# Patient Record
Sex: FEMALE | Race: WHITE | NOT HISPANIC OR LATINO | Employment: OTHER | URBAN - METROPOLITAN AREA
[De-identification: names, ages, dates, MRNs, and addresses within clinical notes are randomized per-mention and may not be internally consistent; named-entity substitution may affect disease eponyms.]

---

## 2018-08-20 ENCOUNTER — OFFICE VISIT (OUTPATIENT)
Dept: ORTHOPEDIC SURGERY | Facility: CLINIC | Age: 73
End: 2018-08-20

## 2018-08-20 VITALS — WEIGHT: 226.19 LBS | BODY MASS INDEX: 37.69 KG/M2 | OXYGEN SATURATION: 96 % | HEART RATE: 90 BPM | HEIGHT: 65 IN

## 2018-08-20 DIAGNOSIS — S97.82XA CRUSHING INJURY OF LEFT FOOT, INITIAL ENCOUNTER: Primary | ICD-10-CM

## 2018-08-20 DIAGNOSIS — I87.8 VENOUS STASIS: ICD-10-CM

## 2018-08-20 PROCEDURE — 99203 OFFICE O/P NEW LOW 30 MIN: CPT | Performed by: ORTHOPAEDIC SURGERY

## 2018-08-20 RX ORDER — AMLODIPINE BESYLATE 10 MG/1
10 TABLET ORAL DAILY
Refills: 5 | COMMUNITY
Start: 2018-07-19

## 2018-08-20 RX ORDER — ASPIRIN 81 MG/1
81 TABLET ORAL DAILY
COMMUNITY

## 2018-08-20 RX ORDER — HYDROCHLOROTHIAZIDE 25 MG/1
25 TABLET ORAL DAILY
Refills: 5 | COMMUNITY
Start: 2018-07-19 | End: 2020-01-21

## 2018-08-20 RX ORDER — FUROSEMIDE 40 MG/1
40 TABLET ORAL DAILY
Refills: 3 | COMMUNITY
Start: 2018-05-29

## 2018-08-20 RX ORDER — TIZANIDINE HYDROCHLORIDE 4 MG/1
4 CAPSULE ORAL 3 TIMES DAILY
Refills: 3 | COMMUNITY
Start: 2018-07-06

## 2018-08-20 RX ORDER — POTASSIUM CHLORIDE 750 MG/1
CAPSULE, EXTENDED RELEASE ORAL
Refills: 5 | COMMUNITY
Start: 2018-07-25

## 2018-08-20 RX ORDER — LEVOTHYROXINE SODIUM 112 MCG
112 TABLET ORAL DAILY
Refills: 2 | COMMUNITY
Start: 2018-06-04

## 2018-08-20 RX ORDER — OLMESARTAN MEDOXOMIL 40 MG/1
40 TABLET ORAL DAILY
Refills: 5 | COMMUNITY
Start: 2018-07-19

## 2018-08-20 RX ORDER — CELECOXIB 200 MG/1
200 CAPSULE ORAL DAILY
Refills: 6 | COMMUNITY
Start: 2018-07-05

## 2018-08-20 RX ORDER — MECLIZINE HYDROCHLORIDE 25 MG/1
25 TABLET ORAL DAILY PRN
Refills: 1 | COMMUNITY
Start: 2018-07-05

## 2018-08-20 NOTE — PROGRESS NOTES
"NEW PATIENT    Patient: Inga Manzo  : 1945    Primary Care Provider: Tim Anaya PA    Requesting Provider: As above    Pain and Edema of the Left Foot      History    Chief Complaint: Left foot injury    History of Present Illness: This is a 73-year-old woman here today with her .  She looks much younger than stated age.  She reports that on July 3, she \"banged up\" her left foot on a plastic stepstool.  She had medial and lateral ecchymosis.  She had a lot of soreness.  She had an x-ray done at that time, and she brought a disc.  Unfortunately the x-ray on the disc is dated 2017.  She reports she never had an x-ray before that day last month, but the x-ray has a different date.  Therefore we will need to repeat it.  She reports that the pain improved, but she still has a level 1-2 out of 10 soreness over medial bunion and lateral cuboid.  She has a history of right insertional Achilles tendinitis that she treats with the boot.  She does have swelling in her legs she reports that she wears support stockings \"sometimes\" she has quite severe edema and venous stasis changes and I recommend she wear the stockings daily.  We discussed her to obtain them and what type.    No current outpatient prescriptions on file prior to visit.     No current facility-administered medications on file prior to visit.       No Known Allergies   Past Medical History:   Diagnosis Date   • Hypertension      Past Surgical History:   Procedure Laterality Date   • CATARACT EXTRACTION, BILATERAL     • CHOLECYSTECTOMY     • HYSTERECTOMY     • OOPHORECTOMY       Family History   Problem Relation Age of Onset   • Heart disease Mother    • Arthritis Mother    • Hypertension Mother    • Diabetes Mother    • Cancer Father    • Stroke Father       Social History     Social History   • Marital status:      Spouse name: N/A   • Number of children: N/A   • Years of education: N/A     Occupational History   • Not " "on file.     Social History Main Topics   • Smoking status: Never Smoker   • Smokeless tobacco: Never Used   • Alcohol use No   • Drug use: No   • Sexual activity: Defer     Other Topics Concern   • Not on file     Social History Narrative   • No narrative on file        Review of Systems   Constitutional: Negative.    Eyes: Positive for itching.   Respiratory: Negative.    Cardiovascular: Positive for leg swelling.   Gastrointestinal: Negative.    Endocrine: Negative.    Genitourinary: Negative.    Musculoskeletal: Positive for arthralgias, joint swelling and neck pain.   Skin: Negative.    Allergic/Immunologic: Negative.    Neurological: Positive for dizziness and headaches.   Hematological: Negative.    Psychiatric/Behavioral: Negative.        The following portions of the patient's history were reviewed and updated as appropriate: allergies, current medications, past family history, past medical history, past social history, past surgical history and problem list.    Physical Exam:   Pulse 90   Ht 165.1 cm (65\")   Wt 103 kg (226 lb 3.1 oz)   SpO2 96%   BMI 37.64 kg/m²   GENERAL: Body habitus: obese    Lower extremity edema: Left: 3+ pitting; Right: 3+ pitting    Varicose veins:  Left: venous stasis pigment; Right: venous stasis pigment    Gait: normal     Mental Status:  awake and alert; oriented to person, place, and time    Voice:  clear  SKIN:  venous stasis pigment    Hair Growth:  Right:diminished; Left:  diminished  NAILS: Toenails: normal  HEENT: Head: Normocephalic, atraumatic,  without obvious abnormality.  eye: normal external eye, no icterus  ears: normal external ears  nose: normal external nose  pharynx: dental hygiene adequate  PULM:  Repiratory effort normal  CV:  Dorsalis Pedis:  Right: 2+; Left:2+    Posterior Tibial: Right:2+; Left:2+    Capillary Refill:  Brisk  MSK:  Hand:right handed and moderate arthritis      Tibia:  Right:  tender over subcutaneous border; Left:  tender over " subcutaneous border      Ankle:  Right: non tender, ROM  normal and symmetric and motor function  normal; Left:  non tender, ROM  normal and symmetric and motor function  normal      Foot:  Right:  non tender, ROM  normal and symmetric and motor function  normal; Left:  No tenderness in the left foot, no tenderness over the bunion, no tenderness over the cuboid, normal range of motion, normal alignment except for the bunion      NEURO: Heel Walking:  Right:  normal; Left:  normal    Toe Walking:  Right:  normal; Left:  normal     [place]-Elie 5.07 monofilament test: normal    Lower extremity sensation: intact     Reflexes:  Biceps:  Right:  2+; Left:  2+           Quads:  Right:  2+; Left:  2+           Ankle:  Right:  1+; Left:  1+      Calf Atrophy:none    Motor Function: all 5/5         Medical Decision Making    Data Review:   ordered and reviewed x-rays today, reviewed radiology images and reviewed outside records I reviewed her outside medical records as well as the report of an x-ray done on July 5, 2018, which was no fracture.  I also reviewed the x-ray on a disc that was dated August 7, 2017.  No fractures on that one.    Assessment and Plan/ Diagnosis/Treatment options:   1. Crushing injury of left foot, initial encounter  I do not see any fractures.  Her symptoms have improved.  I recommend she work on some strengthening exercises on her own.  I would recommend good comfortable shoes.  I will be happy to see her any time.      2. Venous stasis  She has significant venous stasis, I think it's really important she wear support stockings daily.  We discussed where to obtain them and what type

## 2019-08-21 RX ORDER — SODIUM, POTASSIUM,MAG SULFATES 17.5-3.13G
2 SOLUTION, RECONSTITUTED, ORAL ORAL TAKE AS DIRECTED
Qty: 354 ML | Refills: 0 | Status: SHIPPED | OUTPATIENT
Start: 2019-08-21 | End: 2020-01-21

## 2019-08-29 ENCOUNTER — LAB REQUISITION (OUTPATIENT)
Dept: LAB | Facility: HOSPITAL | Age: 74
End: 2019-08-29

## 2019-08-29 ENCOUNTER — OUTSIDE FACILITY SERVICE (OUTPATIENT)
Dept: GASTROENTEROLOGY | Facility: CLINIC | Age: 74
End: 2019-08-29

## 2019-08-29 DIAGNOSIS — R19.7 DIARRHEA: ICD-10-CM

## 2019-08-29 DIAGNOSIS — Z86.010 HISTORY OF COLONIC POLYPS: ICD-10-CM

## 2019-08-29 PROCEDURE — 45380 COLONOSCOPY AND BIOPSY: CPT | Performed by: INTERNAL MEDICINE

## 2019-08-29 PROCEDURE — 88305 TISSUE EXAM BY PATHOLOGIST: CPT | Performed by: INTERNAL MEDICINE

## 2019-08-30 LAB
CYTO UR: NORMAL
LAB AP CASE REPORT: NORMAL
LAB AP CLINICAL INFORMATION: NORMAL
PATH REPORT.FINAL DX SPEC: NORMAL
PATH REPORT.GROSS SPEC: NORMAL

## 2020-01-21 ENCOUNTER — OFFICE VISIT (OUTPATIENT)
Dept: GASTROENTEROLOGY | Facility: CLINIC | Age: 75
End: 2020-01-21

## 2020-01-21 VITALS
WEIGHT: 223 LBS | HEART RATE: 71 BPM | BODY MASS INDEX: 37.15 KG/M2 | SYSTOLIC BLOOD PRESSURE: 142 MMHG | DIASTOLIC BLOOD PRESSURE: 62 MMHG | HEIGHT: 65 IN

## 2020-01-21 DIAGNOSIS — K58.0 IRRITABLE BOWEL SYNDROME WITH DIARRHEA: Primary | ICD-10-CM

## 2020-01-21 PROCEDURE — 99212 OFFICE O/P EST SF 10 MIN: CPT | Performed by: NURSE PRACTITIONER

## 2020-01-21 RX ORDER — POTASSIUM CHLORIDE 20 MEQ/1
1 TABLET, EXTENDED RELEASE ORAL DAILY
COMMUNITY
Start: 2020-01-16

## 2020-01-21 RX ORDER — SPIRONOLACTONE 50 MG/1
1 TABLET, FILM COATED ORAL DAILY
COMMUNITY
Start: 2019-10-30

## 2020-01-21 RX ORDER — COLESEVELAM 180 1/1
1250 TABLET ORAL
Qty: 540 TABLET | Refills: 3 | Status: SHIPPED | OUTPATIENT
Start: 2020-01-21

## 2020-01-21 RX ORDER — IBUPROFEN 600 MG/1
1 TABLET ORAL AS NEEDED
COMMUNITY
Start: 2019-10-30

## 2020-01-21 RX ORDER — MONTELUKAST SODIUM 10 MG/1
1 TABLET ORAL DAILY
COMMUNITY
Start: 2020-01-16

## 2020-01-21 RX ORDER — COLESEVELAM 180 1/1
1 TABLET ORAL DAILY
COMMUNITY
Start: 2020-01-14 | End: 2020-01-21 | Stop reason: SDUPTHER

## 2020-01-21 NOTE — PROGRESS NOTES
GASTROENTEROLOGY OFFICE NOTE  Inga Manzo  [id number]  1945    CARE TEAM  Patient Care Team:  Tim Anaya PA as PCP - General (Physician Assistant)    Referring Provider: Tim Anaya PA    Chief Complaint   Patient presents with   • Diarrhea        HISTORY OF PRESENT ILLNESS:  The patient is a 74-year-old female who is seen today for diarrhea.  Last colonoscopy in August 2019 was normal.  She had complaints of postprandial diarrhea at that time which was well controlled taking WelChol 625 mg 2 with meals.  She continues to do well on this regimen. At times she does have some episodes of diarrhea she feels it is likely related to diet.    PAST MEDICAL HISTORY  Past Medical History:   Diagnosis Date   • Hypertension         PAST SURGICAL HISTORY  Past Surgical History:   Procedure Laterality Date   • CATARACT EXTRACTION, BILATERAL     • CHOLECYSTECTOMY     • HYSTERECTOMY     • OOPHORECTOMY          MEDICATIONS:    Current Outpatient Medications:   •  amLODIPine (NORVASC) 10 MG tablet, Take 10 mg by mouth Daily., Disp: , Rfl: 5  •  aspirin 81 MG EC tablet, Take 81 mg by mouth Daily., Disp: , Rfl:   •  celecoxib (CeleBREX) 200 MG capsule, Take 200 mg by mouth Daily., Disp: , Rfl: 6  •  Cholecalciferol (VITAMIN D PO), Take  by mouth Daily., Disp: , Rfl:   •  CINNAMON PO, Take  by mouth Daily., Disp: , Rfl:   •  colesevelam (WELCHOL) 625 MG tablet, Take 2 tablets by mouth 3 (Three) Times a Day With Meals., Disp: 540 tablet, Rfl: 3  •  Cyanocobalamin (VITAMIN B-12 ER PO), Take  by mouth Daily., Disp: , Rfl:   •  ibuprofen (ADVIL,MOTRIN) 600 MG tablet, Take 1 tablet by mouth As Needed., Disp: , Rfl:   •  LASIX 40 MG tablet, Take 40 mg by mouth Daily., Disp: , Rfl: 3  •  meclizine (ANTIVERT) 25 MG tablet, Take 25 mg by mouth Daily As Needed., Disp: , Rfl: 1  •  montelukast (SINGULAIR) 10 MG tablet, Take 1 tablet by mouth Daily., Disp: , Rfl:   •  Multiple Vitamin (MULTI-VITAMIN DAILY PO), Take  by  "mouth Daily., Disp: , Rfl:   •  olmesartan (BENICAR) 40 MG tablet, Take 40 mg by mouth Daily., Disp: , Rfl: 5  •  Omega-3 Fatty Acids (FISH OIL PO), Take  by mouth Daily., Disp: , Rfl:   •  potassium chloride (K-DUR,KLOR-CON) 20 MEQ CR tablet, Take 1 tablet by mouth Daily., Disp: , Rfl:   •  potassium chloride (MICRO-K) 10 MEQ CR capsule, TAKE  1  CAPSULE BY MOUTH TWICE DAILY WITH FOOD, Disp: , Rfl: 5  •  spironolactone (ALDACTONE) 50 MG tablet, Take 1 tablet by mouth Daily., Disp: , Rfl:   •  SYNTHROID 112 MCG tablet, Take 112 mcg by mouth Daily., Disp: , Rfl: 2  •  ZANAFLEX 4 MG capsule, Take 4 mg by mouth 3 (Three) Times a Day., Disp: , Rfl: 3    ALLERGIES  No Known Allergies    FAMILY HISTORY:  Family History   Problem Relation Age of Onset   • Heart disease Mother    • Arthritis Mother    • Hypertension Mother    • Diabetes Mother    • Cancer Father    • Stroke Father        SOCIAL HISTORY  Social History     Socioeconomic History   • Marital status:      Spouse name: Not on file   • Number of children: Not on file   • Years of education: Not on file   • Highest education level: Not on file   Tobacco Use   • Smoking status: Never Smoker   • Smokeless tobacco: Never Used   Substance and Sexual Activity   • Alcohol use: No   • Drug use: No   • Sexual activity: Defer       REVIEW OF SYSTEMS  Review of Systems   Constitutional: Negative for activity change, appetite change, chills, diaphoresis, fatigue, fever, unexpected weight gain and unexpected weight loss.   HENT: Negative for trouble swallowing and voice change.    Gastrointestinal: Negative for abdominal distention, abdominal pain, anal bleeding, blood in stool, constipation, diarrhea, nausea, rectal pain, vomiting, GERD and indigestion.     PHYSICAL EXAM   /62 (BP Location: Right arm, Patient Position: Sitting, Cuff Size: Adult)   Pulse 71   Ht 165.1 cm (65\")   Wt 101 kg (223 lb)   BMI 37.11 kg/m²   Physical Exam   Constitutional: She is " oriented to person, place, and time. She appears well-developed and well-nourished.   HENT:   Head: Normocephalic.   Mouth/Throat: Oropharynx is clear and moist.   Eyes: Pupils are equal, round, and reactive to light. EOM are normal.   Neck: Normal range of motion. Neck supple.   Cardiovascular: Normal rate and regular rhythm.   Pulmonary/Chest: Effort normal and breath sounds normal. She has no wheezes. She has no rales.   Abdominal: Soft. Bowel sounds are normal. She exhibits no mass. There is no tenderness. There is no rebound and no guarding. No hernia.   Musculoskeletal: Normal range of motion.   Neurological: She is alert and oriented to person, place, and time. No cranial nerve deficit.   Skin: Skin is warm and dry.   Psychiatric: She has a normal mood and affect. Her behavior is normal. Judgment normal.   Nursing note and vitals reviewed.        Results Review:  [facility] and discussed with patient.     ASSESSMENT / PLAN  1.  IBS-D  -Continue WelChol 625 mg 2 twice daily taken with meals (refill provided)  -FODMAP diet    Return in about 1 year (around 1/21/2021).    I discussed the patients findings and my recommendations with patient    CRISTOPHER Jimenez

## 2021-03-25 RX ORDER — COLESEVELAM 180 1/1
TABLET ORAL
Qty: 540 TABLET | Refills: 3 | OUTPATIENT
Start: 2021-03-25

## 2024-05-02 ENCOUNTER — OFFICE VISIT (OUTPATIENT)
Dept: ENDOCRINOLOGY | Facility: CLINIC | Age: 79
End: 2024-05-02
Payer: MEDICARE

## 2024-05-02 VITALS
WEIGHT: 199 LBS | HEART RATE: 62 BPM | OXYGEN SATURATION: 96 % | BODY MASS INDEX: 33.15 KG/M2 | SYSTOLIC BLOOD PRESSURE: 124 MMHG | DIASTOLIC BLOOD PRESSURE: 68 MMHG | HEIGHT: 65 IN

## 2024-05-02 DIAGNOSIS — E05.90 HYPERTHYROIDISM: Primary | ICD-10-CM

## 2024-05-02 PROBLEM — E03.9 ACQUIRED HYPOTHYROIDISM: Status: ACTIVE | Noted: 2024-05-02

## 2024-05-02 PROCEDURE — 84443 ASSAY THYROID STIM HORMONE: CPT | Performed by: INTERNAL MEDICINE

## 2024-05-02 PROCEDURE — 84439 ASSAY OF FREE THYROXINE: CPT | Performed by: INTERNAL MEDICINE

## 2024-05-02 PROCEDURE — 36415 COLL VENOUS BLD VENIPUNCTURE: CPT | Performed by: INTERNAL MEDICINE

## 2024-05-02 PROCEDURE — 1159F MED LIST DOCD IN RCRD: CPT | Performed by: INTERNAL MEDICINE

## 2024-05-02 PROCEDURE — 1160F RVW MEDS BY RX/DR IN RCRD: CPT | Performed by: INTERNAL MEDICINE

## 2024-05-02 PROCEDURE — 84445 ASSAY OF TSI GLOBULIN: CPT | Performed by: INTERNAL MEDICINE

## 2024-05-02 PROCEDURE — 99203 OFFICE O/P NEW LOW 30 MIN: CPT | Performed by: INTERNAL MEDICINE

## 2024-05-02 RX ORDER — AMLODIPINE BESYLATE 5 MG/1
1 TABLET ORAL DAILY
COMMUNITY

## 2024-05-02 RX ORDER — MONTELUKAST SODIUM 4 MG/1
TABLET, CHEWABLE ORAL
COMMUNITY

## 2024-05-02 RX ORDER — LEVOTHYROXINE SODIUM 88 MCG
1 TABLET ORAL DAILY
COMMUNITY
Start: 2024-04-16 | End: 2024-05-02

## 2024-05-02 RX ORDER — SPIRONOLACTONE 25 MG/1
1 TABLET ORAL DAILY
COMMUNITY

## 2024-05-02 RX ORDER — ROSUVASTATIN CALCIUM 40 MG/1
1 TABLET, COATED ORAL DAILY
COMMUNITY

## 2024-05-02 RX ORDER — ICOSAPENT ETHYL 1000 MG/1
CAPSULE ORAL
COMMUNITY

## 2024-05-02 RX ORDER — NEBIVOLOL HYDROCHLORIDE 20 MG/1
1 TABLET ORAL DAILY
COMMUNITY

## 2024-05-02 RX ORDER — SEMAGLUTIDE 2.68 MG/ML
INJECTION, SOLUTION SUBCUTANEOUS
COMMUNITY

## 2024-05-02 NOTE — PROGRESS NOTES
"     Office Note      Date: 2024  Patient Name: Inga Manzo  MRN: [id number]  : 1945    Chief Complaint   Patient presents with    Hyperthyroidism     New patient       History of Present Illness:   Inga Manzo is a 79 y.o. female who presents for Hyperthyroidism (New patient)  .   Patient is seen for a new patient evaluation  She has had  thyroid disease for a long time   She had been on as much as 112 mcg per day. But then her tsh was  low and her dose was reduced to 100 and then 88 and then about 2 weeks ago the medication was stopped as her tsh was 0.     [place]  No hi iodine intake     She  had thymic radiation as a child and was on T4 to prevent nodules   She had a tiny 7 mm nodule in the right lobe that had been stable for years.  I last saw her in 2018  Subjective          Review of Systems:   Review of Systems   Constitutional:  Positive for fatigue and unexpected weight change.   HENT:  Negative for trouble swallowing and voice change.    Eyes:  Negative for visual disturbance.   Cardiovascular:  Negative for palpitations.   Endocrine: Negative for cold intolerance and heat intolerance.   Neurological:  Negative for tremors.   Psychiatric/Behavioral:  Negative for sleep disturbance.        The following portions of the patient's history were reviewed and updated as appropriate: allergies, current medications, past family history, past medical history, past social history, past surgical history, and problem list.    Objective     Visit Vitals  /68   Pulse 62   Ht 165.1 cm (65\")   Wt 90.3 kg (199 lb)   SpO2 96%   BMI 33.12 kg/m²           Physical Exam:  Physical Exam  Vitals reviewed.   Constitutional:       Appearance: Normal appearance.   Eyes:      Extraocular Movements: Extraocular movements intact.   Neck:      Comments: Tiny right thyroid nodule on exam   Cardiovascular:      Rate and Rhythm: Normal rate.   Pulmonary:      Effort: Pulmonary effort is normal. "   Lymphadenopathy:      Cervical: No cervical adenopathy.   Neurological:      Mental Status: She is alert.   Psychiatric:         Mood and Affect: Mood normal.         Thought Content: Thought content normal.         Judgment: Judgment normal.         Assessment / Plan      Assessment & Plan:  Problem List Items Addressed This Visit       Hyperthyroidism - Primary    Overview     She has had  thyroid disease for a long time   She had been on as much as 112 mcg per day. But then her tsh was  low and her dose was reduced to 100 and then 88 and then about 2 weeks ago the medication was stopped as her tsh was 0.    [place]  No hi iodine intake    She  had thymic radiation as a child and was on T4 to prevent nodules   She had a tiny 7 mm nodule in the right lobe that had been stable for years.  I last saw her in 2018         Current Assessment & Plan     Assessment- was on t4- 88 mcg per day due to prior hx of thymic radiation. Labs showed this was too high a dose.     Plan : check labs as ordered. We don't know if she has underlying hyperthyroidism or if she was just on too much medication or even if she still requires any thyroid medication. We will have to see what her labs show.          Relevant Medications    Bystolic 20 MG tablet    Other Relevant Orders    TSH    T4, Free    Thyroid Stimulating Immunoglobulin        Electronically signed by  : Mikey Fowler MD   05/02/2024

## 2024-05-02 NOTE — ASSESSMENT & PLAN NOTE
Assessment- was on t4- 88 mcg per day due to prior hx of thymic radiation. Labs showed this was too high a dose.     Plan : check labs as ordered. We don't know if she has underlying hyperthyroidism or if she was just on too much medication or even if she still requires any thyroid medication. We will have to see what her labs show.

## 2024-05-03 ENCOUNTER — TELEPHONE (OUTPATIENT)
Dept: ENDOCRINOLOGY | Facility: CLINIC | Age: 79
End: 2024-05-03
Payer: MEDICARE

## 2024-05-03 LAB
T4 FREE SERPL-MCNC: 0.65 NG/DL (ref 0.93–1.7)
TSH SERPL DL<=0.05 MIU/L-ACNC: 3.06 UIU/ML (ref 0.27–4.2)

## 2024-05-05 LAB — TSI SER-ACNC: <0.1 IU/L (ref 0–0.55)

## 2024-05-06 ENCOUNTER — TELEPHONE (OUTPATIENT)
Dept: ENDOCRINOLOGY | Facility: CLINIC | Age: 79
End: 2024-05-06
Payer: MEDICARE

## 2024-05-06 DIAGNOSIS — E05.90 HYPERTHYROIDISM: Primary | ICD-10-CM

## 2024-05-06 RX ORDER — LEVOTHYROXINE SODIUM 25 MCG
25 TABLET ORAL
Qty: 30 TABLET | Refills: 5 | Status: SHIPPED | OUTPATIENT
Start: 2024-05-06

## 2024-06-26 DIAGNOSIS — E05.90 HYPERTHYROIDISM: ICD-10-CM

## 2024-07-01 ENCOUNTER — TELEPHONE (OUTPATIENT)
Age: 79
End: 2024-07-01

## 2024-07-01 ENCOUNTER — PRIOR AUTHORIZATION (OUTPATIENT)
Dept: ENDOCRINOLOGY | Facility: CLINIC | Age: 79
End: 2024-07-01
Payer: MEDICARE

## 2024-07-01 NOTE — TELEPHONE ENCOUNTER
STEPHANIE LOMAS* (Key: [id number])  PA Case ID #: [id number]  Rx #: [id number]  Need Help? Call us at [phone]  Outcome  Approved today  CaseId:[id number];Status:Approved;Review Type:Prior Auth;Coverage Start Date:06/01/2024;Coverage End Date:07/01/2025;  Authorization Expiration Date: 6/30/2025  Drug  Synthroid 25MCG tablets  ePA cloud logo  Form  [employer] PA Form (2017 NCP[employer])

## 2024-07-01 NOTE — TELEPHONE ENCOUNTER
Caller: Inga Manzo    Relationship: Self    Best call back number: [phone]    Which medication are you concerned about: SYNTHROID    Who prescribed you this medication: DR GODOY    What are your concerns: PRIOR AUTH NEEDED FOR SYNTHROID

## 2024-10-22 RX ORDER — LEVOTHYROXINE SODIUM 25 MCG
25 TABLET ORAL
Qty: 30 TABLET | Refills: 5 | Status: SHIPPED | OUTPATIENT
Start: 2024-10-22

## 2024-10-22 NOTE — TELEPHONE ENCOUNTER
PT CALLED REQUESTING SYNTHROID RX TO BE SENT IN TO [facility] IN [place], [place] ON [address].

## 2024-12-02 RX ORDER — SODIUM, POTASSIUM,MAG SULFATES 17.5-3.13G
SOLUTION, RECONSTITUTED, ORAL ORAL
Qty: 354 ML | Refills: 0 | Status: SHIPPED | OUTPATIENT
Start: 2024-12-02

## 2024-12-11 ENCOUNTER — OUTSIDE FACILITY SERVICE (OUTPATIENT)
Dept: GASTROENTEROLOGY | Facility: CLINIC | Age: 79
End: 2024-12-11
Payer: MEDICARE

## 2024-12-11 PROCEDURE — 88305 TISSUE EXAM BY PATHOLOGIST: CPT | Performed by: INTERNAL MEDICINE

## 2024-12-12 ENCOUNTER — LAB REQUISITION (OUTPATIENT)
Dept: LAB | Facility: HOSPITAL | Age: 79
End: 2024-12-12
Payer: MEDICARE

## 2024-12-12 DIAGNOSIS — Z86.0100 PERSONAL HISTORY OF COLON POLYPS, UNSPECIFIED: ICD-10-CM

## 2024-12-12 DIAGNOSIS — D12.0 BENIGN NEOPLASM OF CECUM: ICD-10-CM

## 2024-12-12 DIAGNOSIS — D12.2 BENIGN NEOPLASM OF ASCENDING COLON: ICD-10-CM

## 2024-12-12 DIAGNOSIS — Z12.11 ENCOUNTER FOR SCREENING FOR MALIGNANT NEOPLASM OF COLON: ICD-10-CM

## 2024-12-12 DIAGNOSIS — D12.3 BENIGN NEOPLASM OF TRANSVERSE COLON: ICD-10-CM

## 2025-02-06 ENCOUNTER — TELEPHONE (OUTPATIENT)
Age: 80
End: 2025-02-06
Payer: MEDICARE

## 2025-02-06 PROBLEM — I34.0 MITRAL INSUFFICIENCY: Status: ACTIVE | Noted: 2023-06-19

## 2025-02-06 NOTE — TELEPHONE ENCOUNTER
SPOKE TO PT-STEPHANIE LOMAS ON 2/6/25 ABOUT [facility] MED ADV INSURANCE NOT BEING IN NETWORK. EXPLAINED TO PT NEED TO CALL INSURANCE COMPANY.

## 2025-02-06 NOTE — TELEPHONE ENCOUNTER
RETURNED CALL TO PT TO SEE IF CALLED HER INSURANCE [facility] MEDICARE ADV AND SHE STATED SHE SPOKE TO MARLO WHO STATED SHE HAS PPO [facility] MEDICARE ADV N AND IF OUR OFFICE BILLED HER THEN THEY WOULD PAY  
1

## 2025-02-07 ENCOUNTER — OFFICE VISIT (OUTPATIENT)
Age: 80
End: 2025-02-07
Payer: MEDICARE

## 2025-02-07 ENCOUNTER — LAB (OUTPATIENT)
Facility: HOSPITAL | Age: 80
End: 2025-02-07
Payer: MEDICARE

## 2025-02-07 VITALS
DIASTOLIC BLOOD PRESSURE: 63 MMHG | BODY MASS INDEX: 34.66 KG/M2 | HEIGHT: 64 IN | HEART RATE: 73 BPM | WEIGHT: 203 LBS | SYSTOLIC BLOOD PRESSURE: 105 MMHG

## 2025-02-07 DIAGNOSIS — I34.0 CHRONIC MITRAL INSUFFICIENCY: ICD-10-CM

## 2025-02-07 DIAGNOSIS — I87.2 VENOUS INSUFFICIENCY OF BOTH LOWER EXTREMITIES: ICD-10-CM

## 2025-02-07 DIAGNOSIS — E78.5 HYPERLIPIDEMIA LDL GOAL <70: ICD-10-CM

## 2025-02-07 DIAGNOSIS — R06.02 SHORTNESS OF BREATH: Primary | ICD-10-CM

## 2025-02-07 LAB
CHOLEST SERPL-MCNC: 106 MG/DL (ref 0–200)
CRP SERPL-MCNC: <0.02 MG/DL (ref 0.01–0.5)
HCYS SERPL-MCNC: 9 UMOL/L (ref 0–15)
HDLC SERPL-MCNC: 58 MG/DL (ref 40–60)
LDLC SERPL CALC-MCNC: 32 MG/DL (ref 0–100)
LDLC/HDLC SERPL: 0.56 {RATIO}
TRIGL SERPL-MCNC: 79 MG/DL (ref 0–150)
VLDLC SERPL-MCNC: 16 MG/DL (ref 5–40)

## 2025-02-07 PROCEDURE — 36415 COLL VENOUS BLD VENIPUNCTURE: CPT

## 2025-02-07 PROCEDURE — 83695 ASSAY OF LIPOPROTEIN(A): CPT

## 2025-02-07 PROCEDURE — 86141 C-REACTIVE PROTEIN HS: CPT

## 2025-02-07 PROCEDURE — 83090 ASSAY OF HOMOCYSTEINE: CPT

## 2025-02-07 PROCEDURE — 80061 LIPID PANEL: CPT

## 2025-02-07 RX ORDER — LEVOTHYROXINE SODIUM 50 MCG
1 TABLET ORAL DAILY
COMMUNITY

## 2025-02-07 NOTE — PROGRESS NOTES
"    Cardiology Established Patient Note     Name: Inga Manzo  :   1945  PCP: Tim Anaya PA  Date:   2025  Department: [facility] CARDIOLOGY  [address]  [facility] [address]  Fax [phone]  Phone [phone]    Chief complaint:SOB and leg swelling    Subjective     History of Present Illness  Inga Manzo is a 79 y.o. female who presents today for chronic conditions as follows:    #1 benign essential hypertension  -Needs blood pressure log  -Normally sys.BP is under 130 and diastolic under 85 mm/Hg    #2 hyperlipidemia  -No recent fasting lipid profile.    #3 chronic venous insufficiency of the lower extremities  -SP remote bilateral greater saphenous vein ablation  -Complaining of worsening bilateral lower extremity edema worse with feet down better with feet up.    #4 Mitral insufficiency  -Hx of, now reporting several week history of severe dyspnea on exertion, ie walking less than 100 yards on level ground, resolving quickly at rest.    The following data was reviewed by: Simón Bowen MD on 2025:  Medical HX  Surgical HX  Social HX  Family HX    Objective     Vital Signs:  /63 (BP Location: Right arm, Patient Position: Sitting)   Pulse 73   Ht 162.6 cm (64\")   Wt 92.1 kg (203 lb)   BMI 34.84 kg/m²   Estimated body mass index is 34.84 kg/m² as calculated from the following:    Height as of this encounter: 162.6 cm (64\").    Weight as of this encounter: 92.1 kg (203 lb).         Cardiovascular:      PMI at left midclavicular line. Normal rate. Regular rhythm. Normal S1. Normal S2.       Murmurs: There is a grade 3/6 holosystolic murmur at the apex.      No gallop.  No click. No rub.   Pulses:     Intact distal pulses.   Edema:     Peripheral edema present.     Ankle: bilateral 2+ edema of the ankle.     Feet: bilateral 3+ edema of the feet.          Assessment and Plan     Assessment & Plan  Shortness of " breath  -with mitral insuffiiency  2 Decho         Venous insufficiency of both lower extremities  Venous reflux study       Hyperlipidemia LDL goal <70   Lipid pane  LPa         Chronic mitral insufficiency  -2 D Echo due to ANDERSON and worsening murmur.         Follow Up  No follow-ups on file.    Simón Bowen MD    [facility] Cardiology

## 2025-02-10 LAB — LPA SERPL-SCNC: 55.9 NMOL/L

## 2025-02-13 RX ORDER — ICOSAPENT ETHYL 1000 MG/1
CAPSULE ORAL
Qty: 360 CAPSULE | Refills: 1 | Status: SHIPPED | OUTPATIENT
Start: 2025-02-13

## 2025-02-21 DIAGNOSIS — E11.9 TYPE 2 DIABETES MELLITUS WITHOUT COMPLICATION, WITH LONG-TERM CURRENT USE OF INSULIN: Primary | ICD-10-CM

## 2025-02-21 DIAGNOSIS — Z79.4 TYPE 2 DIABETES MELLITUS WITHOUT COMPLICATION, WITH LONG-TERM CURRENT USE OF INSULIN: Primary | ICD-10-CM

## 2025-02-21 NOTE — TELEPHONE ENCOUNTER
The patient called and requested a refill for her ozempic to be sent into [facility] on [address]. Her last apt was on 7/15/24 and next apt is on 3/27/25.

## 2025-02-28 ENCOUNTER — HOSPITAL ENCOUNTER (OUTPATIENT)
Dept: CARDIOLOGY | Facility: HOSPITAL | Age: 80
Discharge: HOME | End: 2025-02-28
Payer: MEDICARE

## 2025-02-28 VITALS — WEIGHT: 203.04 LBS | BODY MASS INDEX: 34.66 KG/M2 | HEIGHT: 64 IN

## 2025-02-28 DIAGNOSIS — I87.2 VENOUS INSUFFICIENCY OF BOTH LOWER EXTREMITIES: ICD-10-CM

## 2025-02-28 LAB
BH CV LEFT LOWER VAS AA GSV TRANS DIAMETER: 0.2 CM
BH CV LEFT LOWER VAS GSV KNEE TRANSVERSE DIAMETER: 0.4 CM
BH CV LEFT LOWER VAS GSV PROX TRANSVERSE DIAMETER: 0.3 CM
BH CV LEFT LOWER VAS GSVBELOW KNEE TRANSVERSE DIAMETER: 0.3 CM
BH CV LEFT LOWER VAS PROX FV REFLUX COLOR FLOW TIME: 1.5 SEC
BH CV LEFT LOWER VAS SAPHENOFEMORAL JUNCTION TRANSVERSE DIAMETER: 1 CM
BH CV LEFT LOWER VAS SSV MID CALF TRANS DIAMETER: 0.2 CM
BH CV LEFT LOWER VAS SSV PROX CALF TRANS DIAMETER: 0.2 CM
BH CV LOW VAS LEFT PROX FEMORAL NOT VIS SCRIPTING: 1
BH CV LOW VAS RIGHT GREATER SAPH AK VESSEL: 1
BH CV LOW VAS RIGHT GREATER SAPH BK VESSEL: 1
BH CV LOWER VAS LEFT GSV DIST THIGH COMPRESSIBILTY: NORMAL
BH CV LOWER VAS RIGHT GSV DIST THIGH COMPRESSIBILTY: NORMAL
BH CV LOWER VASCULAR LEFT AA GSV COMPETENT: NORMAL
BH CV LOWER VASCULAR LEFT AA GSV COMPRESS: NORMAL
BH CV LOWER VASCULAR LEFT COMMON FEMORAL AUGMENT: NORMAL
BH CV LOWER VASCULAR LEFT COMMON FEMORAL COMPETENT: NORMAL
BH CV LOWER VASCULAR LEFT COMMON FEMORAL COMPRESS: NORMAL
BH CV LOWER VASCULAR LEFT COMMON FEMORAL PHASIC: NORMAL
BH CV LOWER VASCULAR LEFT COMMON FEMORAL SPONT: NORMAL
BH CV LOWER VASCULAR LEFT DISTAL FEMORAL COMPRESS: NORMAL
BH CV LOWER VASCULAR LEFT GREATER SAPH AK COMPETENT: NORMAL
BH CV LOWER VASCULAR LEFT GREATER SAPH AK COMPRESS: NORMAL
BH CV LOWER VASCULAR LEFT GREATER SAPH BK COMPETENT: NORMAL
BH CV LOWER VASCULAR LEFT GREATER SAPH BK COMPRESS: NORMAL
BH CV LOWER VASCULAR LEFT GSV DIST THIGH COMPETENT: NORMAL
BH CV LOWER VASCULAR LEFT LESSER SAPH COMPETENT: NORMAL
BH CV LOWER VASCULAR LEFT LESSER SAPH COMPRESS: NORMAL
BH CV LOWER VASCULAR LEFT MID FEMORAL AUGMENT: NORMAL
BH CV LOWER VASCULAR LEFT MID FEMORAL COMPETENT: NORMAL
BH CV LOWER VASCULAR LEFT MID FEMORAL COMPRESS: NORMAL
BH CV LOWER VASCULAR LEFT MID FEMORAL PHASIC: NORMAL
BH CV LOWER VASCULAR LEFT MID FEMORAL SPONT: NORMAL
BH CV LOWER VASCULAR LEFT POPLITEAL AUGMENT: NORMAL
BH CV LOWER VASCULAR LEFT POPLITEAL COMPETENT: NORMAL
BH CV LOWER VASCULAR LEFT POPLITEAL COMPRESS: NORMAL
BH CV LOWER VASCULAR LEFT POPLITEAL PHASIC: NORMAL
BH CV LOWER VASCULAR LEFT POPLITEAL SPONT: NORMAL
BH CV LOWER VASCULAR LEFT PROFUNDA FEMORAL COMPETENT: NORMAL
BH CV LOWER VASCULAR LEFT PROFUNDA FEMORAL PHASIC: NORMAL
BH CV LOWER VASCULAR LEFT PROFUNDA FEMORAL SPONT: NORMAL
BH CV LOWER VASCULAR LEFT PROXIMAL FEMORAL COMPETENT: NORMAL
BH CV LOWER VASCULAR LEFT PROXIMAL FEMORAL COMPRESS: NORMAL
BH CV LOWER VASCULAR LEFT PROXIMAL FEMORAL PHASIC: NORMAL
BH CV LOWER VASCULAR LEFT PROXIMAL FEMORAL SPONT: NORMAL
BH CV LOWER VASCULAR LEFT SAPHENOFEMORAL JUNCTION AUGMENT: NORMAL
BH CV LOWER VASCULAR LEFT SAPHENOFEMORAL JUNCTION COMPETENT: NORMAL
BH CV LOWER VASCULAR LEFT SAPHENOFEMORAL JUNCTION COMPRESS: NORMAL
BH CV LOWER VASCULAR LEFT SAPHENOFEMORAL JUNCTION PHASIC: NORMAL
BH CV LOWER VASCULAR LEFT SAPHENOFEMORAL JUNCTION SPONT: NORMAL
BH CV LOWER VASCULAR LEFT SSV MID CALF COMPETENT: NORMAL
BH CV LOWER VASCULAR LEFT SSV MID CALF COMPRESS: NORMAL
BH CV LOWER VASCULAR RIGHT AA GSV COMPETENT: NORMAL
BH CV LOWER VASCULAR RIGHT AA GSV COMPRESS: NORMAL
BH CV LOWER VASCULAR RIGHT COMMON FEMORAL AUGMENT: NORMAL
BH CV LOWER VASCULAR RIGHT COMMON FEMORAL COMPETENT: NORMAL
BH CV LOWER VASCULAR RIGHT COMMON FEMORAL COMPRESS: NORMAL
BH CV LOWER VASCULAR RIGHT COMMON FEMORAL PHASIC: NORMAL
BH CV LOWER VASCULAR RIGHT COMMON FEMORAL SPONT: NORMAL
BH CV LOWER VASCULAR RIGHT DISTAL FEMORAL AUGMENT: NORMAL
BH CV LOWER VASCULAR RIGHT DISTAL FEMORAL COMPETENT: NORMAL
BH CV LOWER VASCULAR RIGHT DISTAL FEMORAL COMPRESS: NORMAL
BH CV LOWER VASCULAR RIGHT DISTAL FEMORAL PHASIC: NORMAL
BH CV LOWER VASCULAR RIGHT DISTAL FEMORAL SPONT: NORMAL
BH CV LOWER VASCULAR RIGHT GREATER SAPH AK COMPETENT: NORMAL
BH CV LOWER VASCULAR RIGHT GREATER SAPH BK COMPETENT: NORMAL
BH CV LOWER VASCULAR RIGHT GREATER SAPH BK COMPRESS: NORMAL
BH CV LOWER VASCULAR RIGHT GSV DIST THIGH COMPETENT: NORMAL
BH CV LOWER VASCULAR RIGHT LESSER SAPH COMPETENT: NORMAL
BH CV LOWER VASCULAR RIGHT LESSER SAPH COMPRESS: NORMAL
BH CV LOWER VASCULAR RIGHT MID FEMORAL AUGMENT: NORMAL
BH CV LOWER VASCULAR RIGHT MID FEMORAL COMPETENT: NORMAL
BH CV LOWER VASCULAR RIGHT MID FEMORAL COMPRESS: NORMAL
BH CV LOWER VASCULAR RIGHT MID FEMORAL PHASIC: NORMAL
BH CV LOWER VASCULAR RIGHT MID FEMORAL SPONT: NORMAL
BH CV LOWER VASCULAR RIGHT PERONEAL COMPRESS: NORMAL
BH CV LOWER VASCULAR RIGHT POPLITEAL AUGMENT: NORMAL
BH CV LOWER VASCULAR RIGHT POPLITEAL COMPETENT: NORMAL
BH CV LOWER VASCULAR RIGHT POPLITEAL COMPRESS: NORMAL
BH CV LOWER VASCULAR RIGHT POPLITEAL PHASIC: NORMAL
BH CV LOWER VASCULAR RIGHT POPLITEAL SPONT: NORMAL
BH CV LOWER VASCULAR RIGHT POSTERIOR TIBIAL COMPRESS: NORMAL
BH CV LOWER VASCULAR RIGHT PROFUNDA FEMORAL COMPETENT: NORMAL
BH CV LOWER VASCULAR RIGHT PROFUNDA FEMORAL PHASIC: NORMAL
BH CV LOWER VASCULAR RIGHT PROFUNDA FEMORAL SPONT: NORMAL
BH CV LOWER VASCULAR RIGHT PROXIMAL FEMORAL AUGMENT: NORMAL
BH CV LOWER VASCULAR RIGHT PROXIMAL FEMORAL COMPETENT: NORMAL
BH CV LOWER VASCULAR RIGHT PROXIMAL FEMORAL COMPRESS: NORMAL
BH CV LOWER VASCULAR RIGHT PROXIMAL FEMORAL PHASIC: NORMAL
BH CV LOWER VASCULAR RIGHT PROXIMAL FEMORAL SPONT: NORMAL
BH CV LOWER VASCULAR RIGHT SAPHENOFEMORAL JUNCTION AUGMENT: NORMAL
BH CV LOWER VASCULAR RIGHT SAPHENOFEMORAL JUNCTION COMPETENT: NORMAL
BH CV LOWER VASCULAR RIGHT SAPHENOFEMORAL JUNCTION COMPRESS: NORMAL
BH CV LOWER VASCULAR RIGHT SAPHENOFEMORAL JUNCTION PHASIC: NORMAL
BH CV LOWER VASCULAR RIGHT SAPHENOFEMORAL JUNCTION SPONT: NORMAL
BH CV LOWER VASCULAR RIGHT SAPHENOPOP JX AUGMENT: NORMAL
BH CV LOWER VASCULAR RIGHT SAPHENOPOP JX COMPETENT: NORMAL
BH CV LOWER VASCULAR RIGHT SAPHENOPOP JX COMPRESS: NORMAL
BH CV LOWER VASCULAR RIGHT SAPHENOPOP JX PHASIC: NORMAL
BH CV LOWER VASCULAR RIGHT SAPHENOPOP JX SPONT: NORMAL
BH CV LOWER VASCULAR RIGHT SSV MID CALF COMPETENT: NORMAL
BH CV LOWER VASCULAR RIGHT SSV MID CALF COMPRESS: NORMAL
BH CV RIGHT LOWER VAS AA GSV TRANS DIAMETER: 0.4 CM
BH CV RIGHT LOWER VAS DISTAL FV REFLUX COLOR FLOW TIME: 1.86 SEC
BH CV RIGHT LOWER VAS GSV KNEE TRANS DIAMETER: 0.5 CM
BH CV RIGHT LOWER VAS GSV PROX CALF REFLUX TIME: 1.8 SEC
BH CV RIGHT LOWER VAS GSV PROX CALF TRANS DIAMETER: 0.4 CM
BH CV RIGHT LOWER VAS GSV PROX THIGH REFLUX TIME: 1.3 SEC
BH CV RIGHT LOWER VAS GSV PROX THIGH TRANS DIAMETER: 0.5 CM
BH CV RIGHT LOWER VAS SAPHENOFEM JUNCTION TRANSVERSE DIAMETER: 1.1 CM
BH CV RIGHT LOWER VAS SPJ TRANS DIAMETER: 0.5 CM
BH CV RIGHT LOWER VAS SSV MID CALF TRANS DIAMETER: 0.2 CM
BH CV RIGHT LOWER VAS SSV PROX CALF TRANS DIAMETER: 0.3 CM
BH CV VAS RIGHT GSV PROXIMAL HIDDEN LRR COMPRESSIBILTY: NORMAL

## 2025-02-28 PROCEDURE — 93970 EXTREMITY STUDY: CPT

## 2025-03-07 ENCOUNTER — OFFICE VISIT (OUTPATIENT)
Age: 80
End: 2025-03-07
Payer: MEDICARE

## 2025-03-07 VITALS
OXYGEN SATURATION: 99 % | HEART RATE: 68 BPM | BODY MASS INDEX: 35.9 KG/M2 | DIASTOLIC BLOOD PRESSURE: 69 MMHG | HEIGHT: 64 IN | SYSTOLIC BLOOD PRESSURE: 118 MMHG | WEIGHT: 210.3 LBS

## 2025-03-07 DIAGNOSIS — E03.9 ACQUIRED HYPOTHYROIDISM: Primary | ICD-10-CM

## 2025-03-07 PROCEDURE — 3078F DIAST BP <80 MM HG: CPT | Performed by: INTERNAL MEDICINE

## 2025-03-07 PROCEDURE — 3074F SYST BP LT 130 MM HG: CPT | Performed by: INTERNAL MEDICINE

## 2025-03-07 PROCEDURE — 84443 ASSAY THYROID STIM HORMONE: CPT | Performed by: INTERNAL MEDICINE

## 2025-03-07 PROCEDURE — 1160F RVW MEDS BY RX/DR IN RCRD: CPT | Performed by: INTERNAL MEDICINE

## 2025-03-07 PROCEDURE — 99213 OFFICE O/P EST LOW 20 MIN: CPT | Performed by: INTERNAL MEDICINE

## 2025-03-07 PROCEDURE — 1159F MED LIST DOCD IN RCRD: CPT | Performed by: INTERNAL MEDICINE

## 2025-03-07 PROCEDURE — 36415 COLL VENOUS BLD VENIPUNCTURE: CPT | Performed by: INTERNAL MEDICINE

## 2025-03-07 NOTE — PROGRESS NOTES
"     Office Note      Date: 2025  Patient Name: Inga Manzo  MRN: [id number]  : 1945    Chief Complaint   Patient presents with    Hyperthyroidism       History of Present Illness:   Inga Manzo is a 80 y.o. female who presents for thryoid   Current rx: t4- 25 mcg per day     Changes in history:none   Questions /problems:she notes weight gain     Subjective          Review of Systems:   Review of Systems   Constitutional:  Positive for unexpected weight change. Negative for fatigue.   HENT:  Negative for trouble swallowing and voice change.    Cardiovascular:  Negative for palpitations.   Endocrine: Negative for cold intolerance and heat intolerance.   Neurological:  Negative for tremors.   Psychiatric/Behavioral:  Negative for sleep disturbance.        The following portions of the patient's history were reviewed and updated as appropriate: allergies, current medications, past family history, past medical history, past social history, past surgical history, and problem list.    Objective     Visit Vitals  /69 (BP Location: Left arm, Patient Position: Sitting, Cuff Size: Adult)   Pulse 68   Ht 162.6 cm (64\")   Wt 95.4 kg (210 lb 4.8 oz)   SpO2 99%   BMI 36.10 kg/m²           Physical Exam:  Physical Exam  Vitals reviewed.   Constitutional:       Appearance: Normal appearance.   Neck:      Comments: Stable nodule  Neurological:      Mental Status: She is alert.   Psychiatric:         Mood and Affect: Mood normal.         Behavior: Behavior normal.         Thought Content: Thought content normal.         Judgment: Judgment normal.         Assessment / Plan      Assessment & Plan:  Problem List Items Addressed This Visit       Acquired hypothyroidism - Primary    Overview     She has had  thyroid disease for a long time   She had been on as much as 112 mcg per day. But then her tsh was  low and her dose was reduced to 100 and then 88 and then  the medication was stopped as her tsh was 0. Then her " tsh went up and thyroid medication was resumed     [place]  No [place] iodine intake    She  had thymic radiation as a child and was on T4 to prevent nodules   She had a tiny 7 mm nodule in the right lobe that had been stable for years.           Current Assessment & Plan     Clinically euthyroid. Thyroid levels ordered. Medication to be adjusted accordingly.   stable         Relevant Medications    Synthroid 25 MCG tablet    Other Relevant Orders    TSH        Electronically signed by : Mikey Fowler MD   03/07/2025

## 2025-03-08 LAB — TSH SERPL DL<=0.05 MIU/L-ACNC: 5.89 UIU/ML (ref 0.27–4.2)

## 2025-03-09 NOTE — ASSESSMENT & PLAN NOTE
Right greater saphenous vein reflux noted following previous bilateral greater saphenous vein ablation.  She would like to defer any intervention for now.  30 mm/hg knee high compression stockings.  Leg elevation x 30 minutes 3 times daily.  Increase your  walking schedule and distance covered.  Weight loss diet.

## 2025-03-09 NOTE — ASSESSMENT & PLAN NOTE
Within guidelines.  Continue Crestor 40 mg p.o. daily.  Needs LP(a) evaluation.    Orders:    Lipoprotein A (LPA); Future    Hepatic Function Panel; Future    High Sensitivity CRP; Future    Lipid Panel; Future

## 2025-03-09 NOTE — ASSESSMENT & PLAN NOTE
Continue current Rx    Orders:    Basic Metabolic Panel; Future    Microalbumin / Creatinine Urine Ratio - Urine, Clean Catch; Future

## 2025-03-09 NOTE — PROGRESS NOTES
"    Cardiology Established Patient Note     Name: Inga Manzo  :   1945  PCP: Tim Anaya PA  Date:   03/10/2025  Department: [facility] CARDIOLOGY  [address]  [facility] [address]  Fax [phone]  Phone [phone]    Chief Complaint: here for my heart.    Subjective     History of Present Illness  Inga Manzo is a 80 y.o. female who presents today for follow-up after having venous duplex study of the right and left lower extremities.  She is status post bilateral greater saphenous ablation several years ago.  Current study shows patent right greater saphenous vein with significant reflux.  The left greater saphenous vein was not identified at the time of examination and presumed to be closed.  She had significant dyspnea on exertion  that had resolved. 2D echocardiogram 2025. Nl AEF , milkd MR. Her cholesterol appears to be within guidelines.As far as her CVI she reports significant improvement in lower extremity swelling.  She reports nl/ BP  at  home.      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 I reviewed and concur     Lab Results   Component Value Date    CHOL 106 2025    TRIG 79 2025    HDL 58 2025    LDL 32 2025      No results found for: \"WBC\", \"RBC\", \"HGB\", \"HCT\", \"MCV\", \"PLT\"  Lab Results   Component Value Date    TSH 5.890 (H) 2025              Objective     Vital Signs:  /67 (BP Location: Right arm, Patient Position: Sitting, Cuff Size: Adult)   Pulse 72   Ht 162.6 cm (64\")   Wt 92.1 kg (203 lb)   BMI 34.84 kg/m²   Estimated body mass index is 34.84 kg/m² as calculated from the following:    Height as of this encounter: 162.6 cm (64\").    Weight as of this encounter: 92.1 kg (203 lb).         Cardiovascular:      Murmurs: There is a " high frequency blowing holosystolic murmur at the apex, radiating to the apex. The intensity does not change with Valsalva. The intensity does not change with respiration.             Assessment and Plan     Assessment & Plan  Pure hypercholesterolemia  Within guidelines.  Continue Crestor 40 mg p.o. daily.  Needs LP(a) evaluation.    Orders:    Lipoprotein A (LPA); Future    Hepatic Function Panel; Future    High Sensitivity CRP; Future    Lipid Panel; Future    Venous insufficiency (chronic) (peripheral)  Right greater saphenous vein reflux noted following previous bilateral greater saphenous vein ablation.  She would like to defer any intervention for now.  30 mm/hg knee high compression stockings.  Leg elevation x 30 minutes 3 times daily.  Increase your  walking schedule and distance covered.  Weight loss diet.         Nonrheumatic mitral valve regurgitation  Severe dyspnea on exertion.  2D echocardiogram if symptomatic.       Hypertension, unspecified type    Continue current Rx    Orders:    Basic Metabolic Panel; Future    Microalbumin / Creatinine Urine Ratio - Urine, Clean Catch; Future      Follow Up  No follow-ups on file.    Simón Bowen MD    [facility] Cardiology

## 2025-03-10 ENCOUNTER — OFFICE VISIT (OUTPATIENT)
Age: 80
End: 2025-03-10
Payer: MEDICARE

## 2025-03-10 VITALS
WEIGHT: 203 LBS | BODY MASS INDEX: 34.66 KG/M2 | DIASTOLIC BLOOD PRESSURE: 67 MMHG | SYSTOLIC BLOOD PRESSURE: 122 MMHG | HEIGHT: 64 IN | HEART RATE: 72 BPM

## 2025-03-10 DIAGNOSIS — I87.2 VENOUS INSUFFICIENCY (CHRONIC) (PERIPHERAL): ICD-10-CM

## 2025-03-10 DIAGNOSIS — I10 HYPERTENSION, UNSPECIFIED TYPE: ICD-10-CM

## 2025-03-10 DIAGNOSIS — I34.0 NONRHEUMATIC MITRAL VALVE REGURGITATION: ICD-10-CM

## 2025-03-10 DIAGNOSIS — E78.00 PURE HYPERCHOLESTEROLEMIA: Primary | ICD-10-CM

## 2025-03-10 NOTE — LETTER
"2025     No Recipients    Patient: Inga Manzo   YOB: 1945   Date of Visit: 3/10/2025     Dear No Recipients:       Thank you for referring Inga Manzo to me for evaluation. Below are the relevant portions of my assessment and plan of care.    If you have questions, please do not hesitate to call me. I look forward to following Inga along with you.         Sincerely,        Simón Bowen MD        CC: No Recipients    Simón Bowen MD  03/10/25 1131  Sign when Signing Visit      Cardiology Established Patient Note     Name: Inga Manzo  :   1945  PCP: Tim Anaya PA  Date:   03/10/2025  Department: [facility] CARDIOLOGY  [address] [place] [id number]  Fax [phone]  Phone [phone]    Chief Complaint: here for my heart.    Subjective     History of Present Illness  Inga Manzo is a 80 y.o. female who presents today for follow-up after having venous duplex study of the right and left lower extremities.  She is status post bilateral greater saphenous ablation several years ago.  Current study shows patent right greater saphenous vein with significant reflux.  The left greater saphenous vein was not identified at the time of examination and presumed to be closed.  She had significant dyspnea on exertion  that had resolved. 2D echocardiogram 2025. Nl AEF , milkd MRRuthy Her cholesterol appears to be within guidelines.As far as her CVI she reports significant improvement in lower extremity swelling.  She reports nl/ BP  at  home.      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 I reviewed and concur     Lab Results   Component Value Date    CHOL 106 2025    TRIG 79 2025    HDL 58 2025    LDL 32 2025      No results found for: \"WBC\", \"RBC\", " "\"HGB\", \"HCT\", \"MCV\", \"PLT\"  Lab Results   Component Value Date    TSH 5.890 (H) 03/07/2025              Objective     Vital Signs:  /67 (BP Location: Right arm, Patient Position: Sitting, Cuff Size: Adult)   Pulse 72   Ht 162.6 cm (64\")   Wt 92.1 kg (203 lb)   BMI 34.84 kg/m²   Estimated body mass index is 34.84 kg/m² as calculated from the following:    Height as of this encounter: 162.6 cm (64\").    Weight as of this encounter: 92.1 kg (203 lb).         Cardiovascular:      Murmurs: There is a high frequency blowing holosystolic murmur at the apex, radiating to the apex. The intensity does not change with Valsalva. The intensity does not change with respiration.             Assessment and Plan     Assessment & Plan  Pure hypercholesterolemia  Within guidelines.  Continue Crestor 40 mg p.o. daily.  Needs LP(a) evaluation.    Orders:  •  Lipoprotein A (LPA); Future  •  Hepatic Function Panel; Future  •  High Sensitivity CRP; Future  •  Lipid Panel; Future    Venous insufficiency (chronic) (peripheral)  Right greater saphenous vein reflux noted following previous bilateral greater saphenous vein ablation.  She would like to defer any intervention for now.  30 mm/hg knee high compression stockings.  Leg elevation x 30 minutes 3 times daily.  Increase your  walking schedule and distance covered.  Weight loss diet.         Nonrheumatic mitral valve regurgitation  Severe dyspnea on exertion.  2D echocardiogram if symptomatic.       Hypertension, unspecified type  {Hypertension is (optional):[id number]}  Continue current Rx    Orders:  •  Basic Metabolic Panel; Future  •  Microalbumin / Creatinine Urine Ratio - Urine, Clean Catch; Future      Follow Up  No follow-ups on file.    Simón Bowen MD    [facility] Cardiology  "

## 2025-03-26 RX ORDER — ROSUVASTATIN CALCIUM 40 MG/1
40 TABLET, COATED ORAL DAILY
Qty: 90 TABLET | Refills: 1 | Status: SHIPPED | OUTPATIENT
Start: 2025-03-26

## 2025-03-26 RX ORDER — SPIRONOLACTONE 25 MG/1
25 TABLET ORAL DAILY
Qty: 90 TABLET | Refills: 1 | Status: SHIPPED | OUTPATIENT
Start: 2025-03-26

## 2025-03-26 NOTE — TELEPHONE ENCOUNTER
Rx Refill Note  Requested Prescriptions     Pending Prescriptions Disp Refills    rosuvastatin (CRESTOR) 40 MG tablet 90 tablet 1     Sig: Take 1 tablet by mouth Daily.    spironolactone (ALDACTONE) 25 MG tablet 90 tablet 1     Sig: Take 1 tablet by mouth Daily.      Patient called requesting refills on medications listed above. Preferred pharmacy [facility] [address] in [place].     Last office visit with prescribing clinician: 3/10/2025   Last telemedicine visit with prescribing clinician: Visit date not found   Next office visit with prescribing clinician: 9/22/2025                        Would you like a call back once the refill request has been completed: [] Yes [] No [] N/A    If the office needs to give you a call back, can they leave a voicemail: [] Yes [] No [] N/A    Pharmacy Info    Last Fill Date:   Rx Written Date:   Prescribed Qty:   Additional Details from Pharmacy:       Lizzy Newby MA  03/26/25, 09:34 EDT

## 2025-04-21 RX ORDER — LEVOTHYROXINE SODIUM 25 MCG
25 TABLET ORAL
Qty: 30 TABLET | Refills: 5 | OUTPATIENT
Start: 2025-04-21

## 2025-04-21 RX ORDER — LEVOTHYROXINE SODIUM 25 MCG
25 TABLET ORAL
Qty: 30 TABLET | Refills: 3 | Status: SHIPPED | OUTPATIENT
Start: 2025-04-21

## 2025-04-21 NOTE — TELEPHONE ENCOUNTER
Patient called office, stated that she is needing a refill on her synthroid 25 mg. She has to have synthroid. She would like this sent to [facility] in [place].

## 2025-04-21 NOTE — TELEPHONE ENCOUNTER
Rx Refill Note  Requested Prescriptions     Pending Prescriptions Disp Refills    Synthroid 25 MCG tablet 30 tablet 3     Sig: Take 1 tablet by mouth Every Morning.      Last office visit with prescribing clinician: 3/7/2025     Next office visit with prescribing clinician: 8/25/2025       Fabiana Gordon MA  04/21/25, 12:10 EDT

## 2025-06-16 ENCOUNTER — TELEPHONE (OUTPATIENT)
Dept: ENDOCRINOLOGY | Facility: CLINIC | Age: 80
End: 2025-06-16

## 2025-06-16 NOTE — TELEPHONE ENCOUNTER
SPOKE TO PATIENT AND TOLD HER DR. GODOY RECOMMENDATIONS ON HER LABS.  PATIENT VOICED UNDERSTANDING.

## 2025-06-16 NOTE — TELEPHONE ENCOUNTER
PATIENT SAW HER PCP ON 6/10/25 AND HAD LABS DRAWN. THE TSH LEVELS WERE 5.39 AND HER PCP WANTS HER TO START TAKING SYNTHROID 50 MCG AS SHE WAS TAKING SYNTHROID 25 MCG. SHE WOULD LIKE TO BE ADVISED BY DR. GODOY ON THE DOSAGE CHANGE AND THE RESULT. PHONE NUMBER [phone]

## 2025-06-24 RX ORDER — AMLODIPINE BESYLATE 5 MG/1
5 TABLET ORAL DAILY
Qty: 90 TABLET | Refills: 1 | Status: SHIPPED | OUTPATIENT
Start: 2025-06-24

## 2025-06-24 NOTE — TELEPHONE ENCOUNTER
Rx Refill Note  Requested Prescriptions     Pending Prescriptions Disp Refills    amLODIPine (NORVASC) 5 MG tablet [Pharmacy Med Name: AMLODIPINE BESYLATE 5 MG TA 5 Tablet] 90 tablet 1     Sig: TAKE 1 TABLET BY MOUTH ONCE A DAY.      Last office visit with prescribing clinician: 3/10/2025   Last telemedicine visit with prescribing clinician: Visit date not found   Next office visit with prescribing clinician: 9/22/2025                        Pharmacy Info    Last Fill Date:  Rx Written Date:   Prescribed Qty:   Additional Details from Pharmacy:    Patient passed protocols per akilah.         Vinita Bae MA  06/24/25, 12:14 EDT

## 2025-07-11 RX ORDER — ICOSAPENT ETHYL 1 G/1
2 CAPSULE ORAL 2 TIMES DAILY WITH MEALS
Qty: 360 CAPSULE | Refills: 1 | Status: SHIPPED | OUTPATIENT
Start: 2025-07-11

## 2025-08-14 DIAGNOSIS — E11.9 TYPE 2 DIABETES MELLITUS WITHOUT COMPLICATION, WITH LONG-TERM CURRENT USE OF INSULIN: ICD-10-CM

## 2025-08-14 DIAGNOSIS — Z79.4 TYPE 2 DIABETES MELLITUS WITHOUT COMPLICATION, WITH LONG-TERM CURRENT USE OF INSULIN: ICD-10-CM

## 2025-08-14 RX ORDER — SEMAGLUTIDE 2.68 MG/ML
INJECTION, SOLUTION SUBCUTANEOUS
Qty: 9 ML | Refills: 1 | Status: SHIPPED | OUTPATIENT
Start: 2025-08-14

## 2025-08-25 ENCOUNTER — OFFICE VISIT (OUTPATIENT)
Age: 80
End: 2025-08-25
Payer: MEDICARE

## 2025-08-25 VITALS
SYSTOLIC BLOOD PRESSURE: 122 MMHG | BODY MASS INDEX: 35.29 KG/M2 | DIASTOLIC BLOOD PRESSURE: 78 MMHG | WEIGHT: 206.7 LBS | OXYGEN SATURATION: 98 % | HEIGHT: 64 IN | HEART RATE: 76 BPM

## 2025-08-25 DIAGNOSIS — E03.9 ACQUIRED HYPOTHYROIDISM: Primary | ICD-10-CM

## 2025-08-25 PROCEDURE — 3074F SYST BP LT 130 MM HG: CPT | Performed by: INTERNAL MEDICINE

## 2025-08-25 PROCEDURE — 1160F RVW MEDS BY RX/DR IN RCRD: CPT | Performed by: INTERNAL MEDICINE

## 2025-08-25 PROCEDURE — 1159F MED LIST DOCD IN RCRD: CPT | Performed by: INTERNAL MEDICINE

## 2025-08-25 PROCEDURE — 3078F DIAST BP <80 MM HG: CPT | Performed by: INTERNAL MEDICINE

## 2025-08-25 PROCEDURE — 99213 OFFICE O/P EST LOW 20 MIN: CPT | Performed by: INTERNAL MEDICINE

## 2025-08-25 RX ORDER — LEVOTHYROXINE SODIUM 25 MCG
25 TABLET ORAL
Qty: 30 TABLET | Refills: 11 | Status: SHIPPED | OUTPATIENT
Start: 2025-08-25